# Patient Record
Sex: FEMALE | Race: WHITE | NOT HISPANIC OR LATINO | URBAN - NONMETROPOLITAN AREA
[De-identification: names, ages, dates, MRNs, and addresses within clinical notes are randomized per-mention and may not be internally consistent; named-entity substitution may affect disease eponyms.]

---

## 2022-09-02 ENCOUNTER — OFFICE VISIT (OUTPATIENT)
Dept: FAMILY MEDICINE | Facility: CLINIC | Age: 67
End: 2022-09-02
Payer: MEDICARE

## 2022-09-02 VITALS
BODY MASS INDEX: 27.45 KG/M2 | OXYGEN SATURATION: 96 % | WEIGHT: 160.81 LBS | TEMPERATURE: 98 F | HEART RATE: 93 BPM | HEIGHT: 64 IN | RESPIRATION RATE: 18 BRPM | DIASTOLIC BLOOD PRESSURE: 85 MMHG | SYSTOLIC BLOOD PRESSURE: 139 MMHG

## 2022-09-02 DIAGNOSIS — H61.22 IMPACTED CERUMEN OF LEFT EAR: Primary | ICD-10-CM

## 2022-09-02 DIAGNOSIS — H92.02 OTALGIA, LEFT: ICD-10-CM

## 2022-09-02 PROCEDURE — 99213 OFFICE O/P EST LOW 20 MIN: CPT | Mod: ,,, | Performed by: FAMILY MEDICINE

## 2022-09-02 PROCEDURE — 99213 PR OFFICE/OUTPT VISIT, EST, LEVL III, 20-29 MIN: ICD-10-PCS | Mod: ,,, | Performed by: FAMILY MEDICINE

## 2022-09-02 NOTE — PROGRESS NOTES
Logan Jones MD    [address] [place] [address]     PATIENT NAME: Kriss Gill  : 1955  DATE: 22  MRN: [id number]      Billing Provider: Logan Jones MD  Level of Service: IN OFFICE/OUTPT VISIT, EST, LEVL III, 20-29 MIN  Patient PCP Information       Provider PCP Type    Logan Jones MD General            Reason for Visit / Chief Complaint: Otalgia (Reports pain with feelings of fullness in and around her left ear since Wednesday and she put ear drops in there yesterday and now it feels completely clogged.  )       Update PCP  Update Chief Complaint         History of Present Illness / Problem Focused Workflow     Kriss Gill presents to the clinic with Otalgia (Reports pain with feelings of fullness in and around her left ear since Wednesday and she put ear drops in there yesterday and now it feels completely clogged.  )     Otalgia   Pertinent negatives include no abdominal pain, coughing, diarrhea, headaches, hearing loss, rash, sore throat or vomiting.     Review of Systems     Review of Systems   Constitutional:  Negative for activity change, appetite change, chills, fatigue and fever.   HENT:  Positive for ear pain. Negative for nasal congestion, hearing loss, postnasal drip and sore throat.    Respiratory:  Negative for cough, chest tightness, shortness of breath and wheezing.    Cardiovascular:  Negative for chest pain, palpitations, leg swelling and claudication.   Gastrointestinal:  Negative for abdominal pain, change in bowel habit, constipation, diarrhea, nausea, vomiting and change in bowel habit.   Genitourinary:  Negative for dysuria.   Musculoskeletal:  Negative for arthralgias, back pain, gait problem and myalgias.   Integumentary:  Negative for rash.   Neurological:  Negative for weakness and headaches.   Psychiatric/Behavioral:  Negative for suicidal ideas. The patient is not nervous/anxious.        Medical / Social / Family History   History reviewed. No pertinent past medical history.    Past Surgical History:   Procedure Laterality Date    HYSTERECTOMY         Social History  Ms.  reports that she has never smoked. She has never used smokeless tobacco. She reports that she does not drink alcohol and does not use drugs.    Family History  Ms.'s family history includes Heart disease in her mother; Meningitis in her father; Tuberculosis in her father.    Medications and Allergies     Medications  No outpatient medications have been marked as taking for the 9/2/22 encounter (Office Visit) with Logan Jones MD.       Allergies  Review of patient's allergies indicates:   Allergen Reactions    Pcn [penicillins]        Physical Examination     Vitals:    09/02/22 1559   BP: 139/85   Pulse: 93   Resp: 18   Temp: 98.4 °F (36.9 °C)     Physical Exam  Vitals and nursing note reviewed.   Constitutional:       General: She is not in acute distress.     Appearance: Normal appearance. She is not ill-appearing.   HENT:      Right Ear: Tympanic membrane, ear canal and external ear normal.      Left Ear: There is impacted cerumen.   Eyes:      Extraocular Movements: Extraocular movements intact.      Pupils: Pupils are equal, round, and reactive to light.   Cardiovascular:      Rate and Rhythm: Normal rate and regular rhythm.      Heart sounds: Normal heart sounds.   Pulmonary:      Effort: Pulmonary effort is normal.      Breath sounds: Normal breath sounds.   Abdominal:      General: Bowel sounds are normal.      Palpations: Abdomen is soft.   Musculoskeletal:         General: Normal range of motion.   Skin:     Findings: No rash.   Neurological:      General: No focal deficit present.      Mental Status: She is alert and oriented to person, place, and time. Mental status is at baseline.   Psychiatric:         Mood and Affect: Mood normal.         Behavior: Behavior normal.        Assessment and Plan (including  Health Maintenance)      Problem List  Smart Sets  Document Outside HM   :    Plan:         Health Maintenance Due   Topic Date Due    Hepatitis C Screening  Never done    Lipid Panel  Never done    COVID-19 Vaccine (1) Never done    TETANUS VACCINE  Never done    Mammogram  Never done    DEXA Scan  Never done    Colorectal Cancer Screening  Never done    Shingles Vaccine (1 of 2) Never done    Pneumococcal Vaccines (Age 65+) (1 - PCV) Never done    Influenza Vaccine (1) Never done       Problem List Items Addressed This Visit          ENT    Impacted cerumen of left ear - Primary    Otalgia, left     Impacted cerumen of left ear    Otalgia, left     The patient has no Health Maintenance topics of status Not Due    Procedures     No future appointments.     Follow up if symptoms worsen or fail to improve.     Signature:  Logan Jones MD  [address] [place] [address]  Phone #: [phone]  Fax #: [phone]    Date of encounter: 9/2/22    There are no Patient Instructions on file for this visit.

## 2024-03-11 LAB
LEFT EYE DM RETINOPATHY: NEGATIVE
RIGHT EYE DM RETINOPATHY: NEGATIVE

## 2024-03-22 ENCOUNTER — OFFICE VISIT (OUTPATIENT)
Dept: FAMILY MEDICINE | Facility: CLINIC | Age: 69
End: 2024-03-22
Payer: MEDICARE

## 2024-03-22 VITALS
DIASTOLIC BLOOD PRESSURE: 77 MMHG | BODY MASS INDEX: 25.98 KG/M2 | TEMPERATURE: 97 F | HEART RATE: 83 BPM | HEIGHT: 64 IN | RESPIRATION RATE: 18 BRPM | SYSTOLIC BLOOD PRESSURE: 122 MMHG | OXYGEN SATURATION: 95 % | WEIGHT: 152.19 LBS

## 2024-03-22 DIAGNOSIS — K13.79 MOUTH SORES: ICD-10-CM

## 2024-03-22 DIAGNOSIS — J02.9 PHARYNGITIS, UNSPECIFIED ETIOLOGY: Primary | ICD-10-CM

## 2024-03-22 PROCEDURE — 99213 OFFICE O/P EST LOW 20 MIN: CPT | Mod: ,,, | Performed by: NURSE PRACTITIONER

## 2024-03-22 PROCEDURE — 96372 THER/PROPH/DIAG INJ SC/IM: CPT | Mod: ,,, | Performed by: NURSE PRACTITIONER

## 2024-03-22 RX ORDER — AZITHROMYCIN 250 MG/1
TABLET, FILM COATED ORAL
Qty: 6 TABLET | Refills: 0 | Status: SHIPPED | OUTPATIENT
Start: 2024-03-22 | End: 2024-05-20

## 2024-03-22 RX ORDER — BETAMETHASONE SODIUM PHOSPHATE AND BETAMETHASONE ACETATE 3; 3 MG/ML; MG/ML
6 INJECTION, SUSPENSION INTRA-ARTICULAR; INTRALESIONAL; INTRAMUSCULAR; SOFT TISSUE ONCE
Status: COMPLETED | OUTPATIENT
Start: 2024-03-22 | End: 2024-03-22

## 2024-03-22 RX ORDER — NYSTATIN 100000 [USP'U]/ML
4 SUSPENSION ORAL 4 TIMES DAILY
Qty: 120 ML | Refills: 1 | Status: SHIPPED | OUTPATIENT
Start: 2024-03-22 | End: 2024-06-19

## 2024-03-22 RX ADMIN — BETAMETHASONE SODIUM PHOSPHATE AND BETAMETHASONE ACETATE 6 MG: 3; 3 INJECTION, SUSPENSION INTRA-ARTICULAR; INTRALESIONAL; INTRAMUSCULAR; SOFT TISSUE at 03:03

## 2024-03-22 NOTE — PROGRESS NOTES
Hortencia Galvan DNP, CORBIN    [address] [place] [address]     PATIENT NAME: Kriss Gill  : 1955  DATE: 3/22/24  MRN: [id number]      Billing Provider: Hortencia Galvan DNP, FNP  Level of Service:   Patient PCP Information       Provider PCP Type    Logan Jones MD General            Reason for Visit / Chief Complaint: Sore Throat (Pt had got sick with probable COVID 3/13/24 (her  had it and was tested but pt was not tested).  She still has soreness in the L side of her throat, worse in the mornings.  She says it feels swollen at times.  She says she has green colored nasal drainage and mucus with cough), Nasal Congestion, and Cough       Update PCP  Update Chief Complaint         History of Present Illness / Problem Focused Workflow     Kriss Gill presents to the clinic with Sore Throat (Pt had got sick with probable COVID 3/13/24 (her  had it and was tested but pt was not tested).  She still has soreness in the L side of her throat, worse in the mornings.  She says it feels swollen at times.  She says she has green colored nasal drainage and mucus with cough), Nasal Congestion, and Cough     Sore Throat   Associated symptoms include coughing. Pertinent negatives include no abdominal pain, congestion, diarrhea, ear pain, headaches, shortness of breath or vomiting.   Cough  Associated symptoms include a sore throat. Pertinent negatives include no chest pain, chills, ear pain, fever, headaches, myalgias, postnasal drip, rash, shortness of breath or wheezing.       Review of Systems     Review of Systems   Constitutional:  Negative for activity change, appetite change, chills, fatigue and fever.   HENT:  Positive for sore throat. Negative for nasal congestion, ear pain, hearing loss and postnasal drip.    Respiratory:  Positive for cough. Negative for chest tightness, shortness of breath and wheezing.    Cardiovascular:  Negative for  "chest pain, palpitations, leg swelling and claudication.   Gastrointestinal:  Negative for abdominal pain, change in bowel habit, constipation, diarrhea, nausea and vomiting.   Genitourinary:  Negative for dysuria.   Musculoskeletal:  Negative for arthralgias, back pain, gait problem and myalgias.   Integumentary:  Negative for rash.   Neurological:  Negative for weakness and headaches.   Psychiatric/Behavioral:  Negative for suicidal ideas. The patient is not nervous/anxious.         Medical / Social / Family History   History reviewed. No pertinent past medical history.    Past Surgical History:   Procedure Laterality Date    HYSTERECTOMY         Social History  Ms. Kriss Gill  reports that she has never smoked. She has never been exposed to tobacco smoke. She has never used smokeless tobacco. She reports that she does not drink alcohol and does not use drugs.    Family History  Ms. Kriss Gill's family history includes Heart disease in her mother; Meningitis in her father; Tuberculosis in her father.    Medications and Allergies     Medications  No outpatient medications have been marked as taking for the 3/22/24 encounter (Office Visit) with Hortencia Galvan DNP, FNP.     Current Facility-Administered Medications for the 3/22/24 encounter (Office Visit) with Hortencia Galvan DNP, FNP   Medication Dose Route Frequency Provider Last Rate Last Admin    [COMPLETED] betamethasone acetate-betamethasone sodium phosphate injection 6 mg  6 mg Intramuscular Once Hortencia Galvan DNP, FNP   6 mg at 03/22/24 1500       Allergies  Review of patient's allergies indicates:   Allergen Reactions    Pcn [penicillins]        Physical Examination     Vitals:    03/22/24 1431   BP: 122/77   BP Location: Left arm   Patient Position: Sitting   BP Method: Large (Automatic)   Pulse: 83   Resp: 18   Temp: 97.3 °F (36.3 °C)   TempSrc: Oral   SpO2: 95%   Weight: 69 kg (152 lb 3.2 oz)   Height: 5' 4" (1.626 m)     Physical " Exam  Vitals and nursing note reviewed.   Constitutional:       General: She is not in acute distress.  HENT:      Nose: Congestion and rhinorrhea present.      Mouth/Throat:      Mouth: Mucous membranes are moist.      Pharynx: Oropharyngeal exudate and posterior oropharyngeal erythema present.   Eyes:      Pupils: Pupils are equal, round, and reactive to light.   Cardiovascular:      Rate and Rhythm: Normal rate and regular rhythm.      Pulses: Normal pulses.      Heart sounds: Normal heart sounds. No murmur heard.  Pulmonary:      Effort: Pulmonary effort is normal. No respiratory distress.      Breath sounds: Normal breath sounds. No wheezing, rhonchi or rales.   Chest:      Chest wall: No tenderness.   Abdominal:      General: Bowel sounds are normal.      Palpations: Abdomen is soft.   Musculoskeletal:         General: Normal range of motion.      Cervical back: Normal range of motion and neck supple.      Right lower leg: No edema.      Left lower leg: No edema.   Skin:     General: Skin is warm and dry.   Neurological:      General: No focal deficit present.      Mental Status: She is alert and oriented to person, place, and time.          Assessment and Plan (including Health Maintenance)      Problem List  Smart Sets  Document Outside HM   :    Plan:         Health Maintenance Due   Topic Date Due    Hepatitis C Screening  Never done    Lipid Panel  Never done    COVID-19 Vaccine (1) Never done    TETANUS VACCINE  Never done    Mammogram  Never done    Hemoglobin A1c (Diabetic Prevention Screening)  Never done    DEXA Scan  Never done    Colorectal Cancer Screening  Never done    Shingles Vaccine (1 of 2) Never done    RSV Vaccine (Age 60+ and Pregnant patients) (1 - 1-dose 60+ series) Never done    Pneumococcal Vaccines (Age 65+) (1 of 1 - PCV) Never done       Problem List Items Addressed This Visit    None  Visit Diagnoses       Pharyngitis, unspecified etiology    -  Primary    Relevant Medications     betamethasone acetate-betamethasone sodium phosphate injection 6 mg (Completed)    azithromycin (ZITHROMAX Z-RADHA) 250 MG tablet    Mouth sores        Relevant Medications    nystatin (MYCOSTATIN) 100,000 unit/mL suspension          Pharyngitis, unspecified etiology  -     betamethasone acetate-betamethasone sodium phosphate injection 6 mg  -     azithromycin (ZITHROMAX Z-RADHA) 250 MG tablet; Take 2 tablets on Day 1 and 1 tablet daily for next 4 days.  Dispense: 6 tablet; Refill: 0    Mouth sores  -     nystatin (MYCOSTATIN) 100,000 unit/mL suspension; Take 4 mLs (400,000 Units total) by mouth 4 (four) times daily.  Dispense: 120 mL; Refill: 1       The patient has no Health Maintenance topics of status Not Due        No future appointments.     Follow up if symptoms worsen or fail to improve.     Signature:  Hortencia Galvan DNP, FNP  [address] [place] [address]  Phone #: [phone]  Fax #: [phone]    Date of encounter: 3/22/24    Patient Instructions   Increase fluid intake. Take meds as prescribed. Follow up if no improvement in 5-7 days.

## 2024-05-11 ENCOUNTER — OFFICE VISIT (OUTPATIENT)
Dept: FAMILY MEDICINE | Facility: CLINIC | Age: 69
End: 2024-05-11
Payer: MEDICARE

## 2024-05-11 VITALS
TEMPERATURE: 99 F | OXYGEN SATURATION: 96 % | RESPIRATION RATE: 16 BRPM | HEIGHT: 64 IN | BODY MASS INDEX: 25.27 KG/M2 | SYSTOLIC BLOOD PRESSURE: 126 MMHG | DIASTOLIC BLOOD PRESSURE: 81 MMHG | WEIGHT: 148 LBS | HEART RATE: 72 BPM

## 2024-05-11 DIAGNOSIS — R05.8 COUGH WITH EXPOSURE TO COVID-19 VIRUS: ICD-10-CM

## 2024-05-11 DIAGNOSIS — G44.83 HEADACHE AFTER COUGH: ICD-10-CM

## 2024-05-11 DIAGNOSIS — Z20.822 COUGH WITH EXPOSURE TO COVID-19 VIRUS: ICD-10-CM

## 2024-05-11 DIAGNOSIS — J06.9 UPPER RESPIRATORY TRACT INFECTION, UNSPECIFIED TYPE: Primary | ICD-10-CM

## 2024-05-11 LAB
CTP QC/QA: YES
CTP QC/QA: YES
POC MOLECULAR INFLUENZA A AGN: NEGATIVE
POC MOLECULAR INFLUENZA B AGN: NEGATIVE
SARS-COV-2 RDRP RESP QL NAA+PROBE: NEGATIVE

## 2024-05-11 PROCEDURE — 87502 INFLUENZA DNA AMP PROBE: CPT | Mod: RHCUB | Performed by: NURSE PRACTITIONER

## 2024-05-11 PROCEDURE — 99213 OFFICE O/P EST LOW 20 MIN: CPT | Mod: ,,, | Performed by: NURSE PRACTITIONER

## 2024-05-11 PROCEDURE — 96372 THER/PROPH/DIAG INJ SC/IM: CPT | Mod: ,,, | Performed by: NURSE PRACTITIONER

## 2024-05-11 PROCEDURE — 87635 SARS-COV-2 COVID-19 AMP PRB: CPT | Mod: RHCUB | Performed by: NURSE PRACTITIONER

## 2024-05-11 RX ORDER — CEFUROXIME AXETIL 250 MG/1
250 TABLET ORAL 2 TIMES DAILY
Qty: 14 TABLET | Refills: 0 | Status: SHIPPED | OUTPATIENT
Start: 2024-05-11 | End: 2024-05-20

## 2024-05-11 RX ORDER — ALBUTEROL SULFATE 90 UG/1
2 AEROSOL, METERED RESPIRATORY (INHALATION) EVERY 6 HOURS PRN
Qty: 25.5 G | Refills: 0 | Status: SHIPPED | OUTPATIENT
Start: 2024-05-11

## 2024-05-11 RX ORDER — BETAMETHASONE SODIUM PHOSPHATE AND BETAMETHASONE ACETATE 3; 3 MG/ML; MG/ML
6 INJECTION, SUSPENSION INTRA-ARTICULAR; INTRALESIONAL; INTRAMUSCULAR; SOFT TISSUE ONCE
Status: COMPLETED | OUTPATIENT
Start: 2024-05-11 | End: 2024-05-11

## 2024-05-11 RX ADMIN — BETAMETHASONE SODIUM PHOSPHATE AND BETAMETHASONE ACETATE 6 MG: 3; 3 INJECTION, SUSPENSION INTRA-ARTICULAR; INTRALESIONAL; INTRAMUSCULAR; SOFT TISSUE at 11:05

## 2024-05-11 NOTE — PATIENT INSTRUCTIONS
Increase fluid intake. Voice rest. Take meds as prescribed. Follow up if no improvement in 5-7 days.

## 2024-05-11 NOTE — PROGRESS NOTES
oHrtencia Galvan DNP, CORBIN    [address] [place] [address]     PATIENT NAME: Kriss Gill  : 1955  DATE: 24  MRN: [id number]      Billing Provider: Hortencia Galvan DNP, FNP  Level of Service:   Patient PCP Information       Provider PCP Type    Logan Jones MD General            Reason for Visit / Chief Complaint: Cough and Chest Congestion (Since Monday )       Update PCP  Update Chief Complaint         History of Present Illness / Problem Focused Workflow     Kriss Gill presents to the clinic with Cough and Chest Congestion (Since Monday )         Review of Systems     Review of Systems   Constitutional:  Negative for appetite change, fatigue, fever and unexpected weight change.   HENT:  Positive for postnasal drip, rhinorrhea and voice change. Negative for hearing loss.    Eyes:  Negative for visual disturbance.   Respiratory:  Positive for cough. Negative for shortness of breath.    Cardiovascular:  Negative for chest pain.   Gastrointestinal:  Negative for abdominal pain, constipation, diarrhea, nausea and vomiting.   Genitourinary:  Negative for dysuria.   Musculoskeletal:  Negative for back pain.   Neurological:  Positive for headaches.   Psychiatric/Behavioral:  Negative for sleep disturbance.         Medical / Social / Family History   History reviewed. No pertinent past medical history.    Past Surgical History:   Procedure Laterality Date    HYSTERECTOMY         Social History  Ms. Gill reports that she has never smoked. She has never been exposed to tobacco smoke. She has never used smokeless tobacco. She reports that she does not drink alcohol and does not use drugs.    Family History  's family history includes Heart disease in her mother; Meningitis in her father; Tuberculosis in her father.    Medications and Allergies     Medications  No outpatient medications have been marked as taking for the 24  "encounter (Office Visit) with Hortencia Galvan DNP, FNP.     Current Facility-Administered Medications for the 5/11/24 encounter (Office Visit) with Hortencia Galvan DNP, FNP   Medication Dose Route Frequency Provider Last Rate Last Admin    betamethasone acetate-betamethasone sodium phosphate injection 6 mg  6 mg Intramuscular Once Hortencia Galvan DNP, FNP           Allergies  Review of patient's allergies indicates:   Allergen Reactions    Pcn [penicillins]        Physical Examination     Vitals:    05/11/24 1016 05/11/24 1024   BP: (!) 156/81 126/81   BP Location: Left arm Right arm   Patient Position: Sitting Sitting   BP Method: X-Large (Automatic) Large (Automatic)   Pulse: 72    Resp: 16    Temp: 98.5 °F (36.9 °C)    TempSrc: Oral    SpO2: 96%    Weight: 67.1 kg (148 lb)    Height: 5' 4" (1.626 m)       Physical Exam  Vitals and nursing note reviewed.   Constitutional:       General: She is not in acute distress.  HENT:      Nose: Congestion and rhinorrhea present.      Mouth/Throat:      Mouth: Mucous membranes are moist.   Eyes:      Pupils: Pupils are equal, round, and reactive to light.   Cardiovascular:      Rate and Rhythm: Normal rate and regular rhythm.      Pulses: Normal pulses.      Heart sounds: Normal heart sounds. No murmur heard.  Pulmonary:      Effort: Pulmonary effort is normal. No respiratory distress.      Breath sounds: Normal breath sounds. No wheezing, rhonchi or rales.   Chest:      Chest wall: No tenderness.   Abdominal:      General: Bowel sounds are normal.      Palpations: Abdomen is soft.   Musculoskeletal:         General: Normal range of motion.      Cervical back: Normal range of motion and neck supple.      Right lower leg: No edema.      Left lower leg: No edema.   Skin:     General: Skin is warm and dry.   Neurological:      General: No focal deficit present.      Mental Status: She is alert and oriented to person, place, and time.            Assessment and Plan (including " Health Maintenance)      Problem List  Smart Sets  Document Outside HM   :    Plan:         Health Maintenance Due   Topic Date Due    Hepatitis C Screening  Never done    Lipid Panel  Never done    TETANUS VACCINE  Never done    Mammogram  Never done    Hemoglobin A1c (Diabetic Prevention Screening)  Never done    DEXA Scan  Never done    Colorectal Cancer Screening  Never done    Shingles Vaccine (1 of 2) Never done    RSV Vaccine (Age 60+ and Pregnant patients) (1 - 1-dose 60+ series) Never done    Pneumococcal Vaccines (Age 65+) (1 of 1 - PCV) Never done    COVID-19 Vaccine (1 - 2023-24 season) Never done       Problem List Items Addressed This Visit    None  Visit Diagnoses       Upper respiratory tract infection, unspecified type    -  Primary    Relevant Medications    betamethasone acetate-betamethasone sodium phosphate injection 6 mg (Start on 5/11/2024 12:15 PM)    cefUROXime (CEFTIN) 250 MG tablet    albuterol (VENTOLIN HFA) 90 mcg/actuation inhaler    Cough with exposure to COVID-19 virus        Relevant Medications    albuterol (VENTOLIN HFA) 90 mcg/actuation inhaler    Other Relevant Orders    POCT COVID-19 Rapid Screening (Completed)    Headache after cough        Relevant Orders    POCT Influenza A/B Molecular (Completed)            The patient has no Health Maintenance topics of status Not Due        No future appointments.     Follow up if symptoms worsen or fail to improve.     Signature:  Hortencia Galvan DNP, FNP  [address] [place] [address]  Phone #: [phone]  Fax #: [phone]    Date of encounter: 5/11/24    Patient Instructions   Increase fluid intake. Voice rest. Take meds as prescribed. Follow up if no improvement in 5-7 days.

## 2024-05-20 ENCOUNTER — OFFICE VISIT (OUTPATIENT)
Dept: FAMILY MEDICINE | Facility: CLINIC | Age: 69
End: 2024-05-20
Payer: MEDICARE

## 2024-05-20 VITALS
TEMPERATURE: 98 F | BODY MASS INDEX: 25.44 KG/M2 | DIASTOLIC BLOOD PRESSURE: 72 MMHG | HEART RATE: 74 BPM | WEIGHT: 149 LBS | OXYGEN SATURATION: 97 % | SYSTOLIC BLOOD PRESSURE: 122 MMHG | RESPIRATION RATE: 18 BRPM | HEIGHT: 64 IN

## 2024-05-20 DIAGNOSIS — Z13.220 SCREENING FOR LIPID DISORDERS: ICD-10-CM

## 2024-05-20 DIAGNOSIS — Z13.1 SCREENING FOR DIABETES MELLITUS: ICD-10-CM

## 2024-05-20 DIAGNOSIS — Z13.0 ENCOUNTER FOR SCREENING FOR DISEASES OF THE BLOOD AND BLOOD-FORMING ORGANS AND CERTAIN DISORDERS INVOLVING THE IMMUNE MECHANISM: ICD-10-CM

## 2024-05-20 DIAGNOSIS — R73.09 OTHER ABNORMAL GLUCOSE: ICD-10-CM

## 2024-05-20 DIAGNOSIS — R79.89 OTHER SPECIFIED ABNORMAL FINDINGS OF BLOOD CHEMISTRY: ICD-10-CM

## 2024-05-20 DIAGNOSIS — R73.9 HYPERGLYCEMIA: Primary | ICD-10-CM

## 2024-05-20 DIAGNOSIS — R20.2 PARESTHESIA OF BOTH LOWER EXTREMITIES: ICD-10-CM

## 2024-05-20 LAB
ALBUMIN SERPL BCP-MCNC: 3.6 G/DL (ref 3.5–5)
ALBUMIN/GLOB SERPL: 1 {RATIO}
ALP SERPL-CCNC: 143 U/L (ref 55–142)
ALT SERPL W P-5'-P-CCNC: 20 U/L (ref 13–56)
ANION GAP SERPL CALCULATED.3IONS-SCNC: 9 MMOL/L (ref 7–16)
AST SERPL W P-5'-P-CCNC: 17 U/L (ref 15–37)
BASOPHILS # BLD AUTO: 0.05 K/UL (ref 0–0.2)
BASOPHILS NFR BLD AUTO: 0.8 % (ref 0–1)
BILIRUB SERPL-MCNC: 0.6 MG/DL (ref ?–1.2)
BILIRUB SERPL-MCNC: NORMAL MG/DL
BLOOD URINE, POC: NORMAL
BUN SERPL-MCNC: 17 MG/DL (ref 7–18)
BUN/CREAT SERPL: 20 (ref 6–20)
CALCIUM SERPL-MCNC: 8.4 MG/DL (ref 8.5–10.1)
CHLORIDE SERPL-SCNC: 102 MMOL/L (ref 98–107)
CHOLEST SERPL-MCNC: 263 MG/DL (ref 0–200)
CHOLEST/HDLC SERPL: 5.1 {RATIO}
CO2 SERPL-SCNC: 28 MMOL/L (ref 21–32)
COLOR, POC UA: YELLOW
CREAT SERPL-MCNC: 0.85 MG/DL (ref 0.55–1.02)
CREAT UR-MCNC: 71 MG/DL (ref 28–219)
DIFFERENTIAL METHOD BLD: ABNORMAL
EGFR (NO RACE VARIABLE) (RUSH/TITUS): 74 ML/MIN/1.73M2
EOSINOPHIL # BLD AUTO: 0.18 K/UL (ref 0–0.5)
EOSINOPHIL NFR BLD AUTO: 2.8 % (ref 1–4)
ERYTHROCYTE [DISTWIDTH] IN BLOOD BY AUTOMATED COUNT: 13.3 % (ref 11.5–14.5)
EST. AVERAGE GLUCOSE BLD GHB EST-MCNC: 341 MG/DL
GLOBULIN SER-MCNC: 3.7 G/DL (ref 2–4)
GLUCOSE SERPL-MCNC: 295 MG/DL (ref 74–106)
GLUCOSE UR QL STRIP: 1000
HBA1C MFR BLD HPLC: 13.5 % (ref 4.5–6.6)
HCT VFR BLD AUTO: 43.3 % (ref 38–47)
HDLC SERPL-MCNC: 52 MG/DL (ref 40–60)
HGB BLD-MCNC: 13.3 G/DL (ref 12–16)
IMM GRANULOCYTES # BLD AUTO: 0.05 K/UL (ref 0–0.04)
IMM GRANULOCYTES NFR BLD: 0.8 % (ref 0–0.4)
KETONES UR QL STRIP: NORMAL
LDLC SERPL CALC-MCNC: 172 MG/DL
LDLC/HDLC SERPL: 3.3 {RATIO}
LEUKOCYTE ESTERASE URINE, POC: NORMAL
LYMPHOCYTES # BLD AUTO: 1.81 K/UL (ref 1–4.8)
LYMPHOCYTES NFR BLD AUTO: 28.5 % (ref 27–41)
MCH RBC QN AUTO: 28 PG (ref 27–31)
MCHC RBC AUTO-ENTMCNC: 30.7 G/DL (ref 32–36)
MCV RBC AUTO: 91.2 FL (ref 80–96)
MICROALBUMIN UR-MCNC: 1.3 MG/DL (ref 0–2.8)
MICROALBUMIN/CREAT RATIO PNL UR: 18.3 MG/G (ref 0–30)
MONOCYTES # BLD AUTO: 0.45 K/UL (ref 0–0.8)
MONOCYTES NFR BLD AUTO: 7.1 % (ref 2–6)
MPC BLD CALC-MCNC: 9.6 FL (ref 9.4–12.4)
NEUTROPHILS # BLD AUTO: 3.82 K/UL (ref 1.8–7.7)
NEUTROPHILS NFR BLD AUTO: 60 % (ref 53–65)
NITRITE, POC UA: NORMAL
NONHDLC SERPL-MCNC: 211 MG/DL
NRBC # BLD AUTO: 0 X10E3/UL
NRBC, AUTO (.00): 0 %
PH, POC UA: 505
PLATELET # BLD AUTO: 395 K/UL (ref 150–400)
POTASSIUM SERPL-SCNC: 4.1 MMOL/L (ref 3.5–5.1)
PROT SERPL-MCNC: 7.3 G/DL (ref 6.4–8.2)
PROTEIN, POC: NORMAL
RBC # BLD AUTO: 4.75 M/UL (ref 4.2–5.4)
SODIUM SERPL-SCNC: 135 MMOL/L (ref 136–145)
SPECIFIC GRAVITY, POC UA: 1.01
TRIGL SERPL-MCNC: 194 MG/DL (ref 35–150)
UROBILINOGEN, POC UA: 0.2
VLDLC SERPL-MCNC: 39 MG/DL
WBC # BLD AUTO: 6.36 K/UL (ref 4.5–11)

## 2024-05-20 PROCEDURE — 82043 UR ALBUMIN QUANTITATIVE: CPT | Mod: ,,, | Performed by: CLINICAL MEDICAL LABORATORY

## 2024-05-20 PROCEDURE — 81003 URINALYSIS AUTO W/O SCOPE: CPT | Mod: RHCUB | Performed by: NURSE PRACTITIONER

## 2024-05-20 PROCEDURE — 80061 LIPID PANEL: CPT | Mod: ,,, | Performed by: CLINICAL MEDICAL LABORATORY

## 2024-05-20 PROCEDURE — 82570 ASSAY OF URINE CREATININE: CPT | Mod: ,,, | Performed by: CLINICAL MEDICAL LABORATORY

## 2024-05-20 PROCEDURE — 80053 COMPREHEN METABOLIC PANEL: CPT | Mod: ,,, | Performed by: CLINICAL MEDICAL LABORATORY

## 2024-05-20 PROCEDURE — 83036 HEMOGLOBIN GLYCOSYLATED A1C: CPT | Mod: ,,, | Performed by: CLINICAL MEDICAL LABORATORY

## 2024-05-20 PROCEDURE — 99214 OFFICE O/P EST MOD 30 MIN: CPT | Mod: ,,, | Performed by: NURSE PRACTITIONER

## 2024-05-20 PROCEDURE — 85025 COMPLETE CBC W/AUTO DIFF WBC: CPT | Mod: ,,, | Performed by: CLINICAL MEDICAL LABORATORY

## 2024-05-20 NOTE — PROGRESS NOTES
Hortencia Galvan DNP, CORBIN    [address] [place] [address]     PATIENT NAME: Kriss Gill  : 1955  DATE: 24  MRN: [id number]      Billing Provider: Hortencia Galvan DNP, FNP  Level of Service:   Patient PCP Information       Provider PCP Type    Logan Jones MD General            Reason for Visit / Chief Complaint: Diabetes (Pt has been keeping a log of FS glucose.  It's been as high as 414 but was 296 this morning after trying to pay attention to diet and drinking more water.  Has never been diagnosed with diabetes.  ), Tingling (Pt has tingling in B feet and in her hands occasionally as well), and Health Maintenance (Discussed overdue health maintenance - pt declines to schedule.  )       Update PCP  Update Chief Complaint         History of Present Illness / Problem Focused Workflow     Kriss Gill presents to the clinic with Diabetes (Pt has been keeping a log of FS glucose.  It's been as high as 414 but was 296 this morning after trying to pay attention to diet and drinking more water.  Has never been diagnosed with diabetes.  ), Tingling (Pt has tingling in B feet and in her hands occasionally as well), and Health Maintenance (Discussed overdue health maintenance - pt declines to schedule.  )     Pt received steroid injection approx 10 days for upper respiratory infection.   Pt's has + family history for diabetes--mother.    Diabetes  Pertinent negatives for hypoglycemia include no headaches or nervousness/anxiousness. Pertinent negatives for diabetes include no chest pain, no fatigue and no weakness.       Review of Systems     Review of Systems   Constitutional:  Negative for activity change, appetite change, chills, fatigue and fever.   HENT:  Negative for nasal congestion, ear pain, hearing loss, postnasal drip and sore throat.    Respiratory:  Negative for cough, chest tightness, shortness of breath and wheezing.    Cardiovascular:   "Negative for chest pain, palpitations, leg swelling and claudication.   Gastrointestinal:  Negative for abdominal pain, change in bowel habit, constipation, diarrhea, nausea and vomiting.   Genitourinary:  Negative for dysuria.   Musculoskeletal:  Negative for arthralgias, back pain, gait problem and myalgias.   Integumentary:  Negative for rash.   Neurological:  Positive for numbness (parathesias bilateral feet). Negative for weakness and headaches.   Psychiatric/Behavioral:  Negative for suicidal ideas. The patient is not nervous/anxious.         Medical / Social / Family History   History reviewed. No pertinent past medical history.    Past Surgical History:   Procedure Laterality Date    HYSTERECTOMY         Social History  Ms. Kriss Gill  reports that she has never smoked. She has never been exposed to tobacco smoke. She has never used smokeless tobacco. She reports that she does not drink alcohol and does not use drugs.    Family History  Ms. Kriss Gill's family history includes Heart disease in her mother; Meningitis in her father; Tuberculosis in her father.    Medications and Allergies     Medications  Outpatient Medications Marked as Taking for the 5/20/24 encounter (Office Visit) with Hortencia Galvan, ZULMA, FNP   Medication Sig Dispense Refill    albuterol (VENTOLIN HFA) 90 mcg/actuation inhaler Inhale 2 puffs into the lungs every 6 (six) hours as needed for Wheezing. Rescue 25.5 g 0       Allergies  Review of patient's allergies indicates:   Allergen Reactions    Pcn [penicillins]        Physical Examination     Vitals:    05/20/24 1010   BP: 122/72   BP Location: Left arm   Patient Position: Sitting   BP Method: Large (Automatic)   Pulse: 74   Resp: 18   Temp: 98.2 °F (36.8 °C)   TempSrc: Oral   SpO2: 97%   Weight: 67.6 kg (149 lb)   Height: 5' 4" (1.626 m)     Physical Exam  Vitals and nursing note reviewed.   Constitutional:       General: She is not in acute distress.  HENT:      Nose: " Nose normal.      Mouth/Throat:      Mouth: Mucous membranes are moist.   Eyes:      Pupils: Pupils are equal, round, and reactive to light.   Cardiovascular:      Rate and Rhythm: Normal rate and regular rhythm.      Pulses: Normal pulses.           Dorsalis pedis pulses are 2+ on the right side and 2+ on the left side.        Posterior tibial pulses are 2+ on the right side and 2+ on the left side.      Heart sounds: Normal heart sounds. No murmur heard.  Pulmonary:      Effort: Pulmonary effort is normal. No respiratory distress.      Breath sounds: Normal breath sounds. No wheezing, rhonchi or rales.   Chest:      Chest wall: No tenderness.   Abdominal:      General: Bowel sounds are normal.      Palpations: Abdomen is soft.   Musculoskeletal:         General: Normal range of motion.      Cervical back: Normal range of motion and neck supple.      Right lower leg: No edema.      Left lower leg: No edema.        Feet:    Feet:      Right foot:      Protective Sensation: 10 sites tested.  10 sites sensed.      Toenail Condition: Right toenails are normal.      Left foot:      Protective Sensation: 10 sites tested.  10 sites sensed.      Skin integrity: Skin integrity normal.      Toenail Condition: Left toenails are normal.      Comments: Healing wound--slow to heal--injury happened approx 3 weeks ago  Skin:     General: Skin is warm and dry.   Neurological:      General: No focal deficit present.      Mental Status: She is alert and oriented to person, place, and time.          Assessment and Plan (including Health Maintenance)      Problem List  Smart Sets  Document Outside HM   :    Plan:         Health Maintenance Due   Topic Date Due    Hepatitis C Screening  Never done    Lipid Panel  Never done    TETANUS VACCINE  Never done    Mammogram  Never done    Hemoglobin A1c (Diabetic Prevention Screening)  Never done    DEXA Scan  Never done    Colorectal Cancer Screening  Never done    Shingles Vaccine (1 of 2)  Never done    RSV Vaccine (Age 60+ and Pregnant patients) (1 - 1-dose 60+ series) Never done    Pneumococcal Vaccines (Age 65+) (1 of 1 - PCV) Never done    COVID-19 Vaccine (1 - 2023-24 season) Never done       Problem List Items Addressed This Visit    None  Visit Diagnoses       Hyperglycemia    -  Primary    Relevant Orders    Comprehensive Metabolic Panel    Microalbumin/Creatinine Ratio, Urine    Hemoglobin A1C    POCT URINALYSIS W/O SCOPE (Completed)    Foot Exam Performed (Completed)    Screening for lipid disorders        Relevant Orders    Lipid Panel    Screening for diabetes mellitus        Relevant Orders    Comprehensive Metabolic Panel    Encounter for screening for diseases of the blood and blood-forming organs and certain disorders involving the immune mechanism        Relevant Orders    CBC Auto Differential    Other abnormal glucose        Relevant Orders    CBC Auto Differential    Other specified abnormal findings of blood chemistry        Relevant Orders    Lipid Panel    Paresthesia of both lower extremities        Relevant Orders    Foot Exam Performed (Completed)    Body mass index 25.0-25.9, adult              Hyperglycemia  -     Comprehensive Metabolic Panel; Future; Expected date: 05/20/2024  -     Microalbumin/Creatinine Ratio, Urine  -     Hemoglobin A1C; Future; Expected date: 05/20/2024  -     POCT URINALYSIS W/O SCOPE  -     Foot Exam Performed    Screening for lipid disorders  -     Lipid Panel; Future; Expected date: 05/20/2024    Screening for diabetes mellitus  -     Comprehensive Metabolic Panel; Future; Expected date: 05/20/2024    Encounter for screening for diseases of the blood and blood-forming organs and certain disorders involving the immune mechanism  -     CBC Auto Differential; Future; Expected date: 05/20/2024    Other abnormal glucose  -     CBC Auto Differential; Future; Expected date: 05/20/2024    Other specified abnormal findings of blood chemistry  -     Lipid  Panel; Future; Expected date: 05/20/2024    Paresthesia of both lower extremities  -     Foot Exam Performed    Body mass index 25.0-25.9, adult       The patient has no Health Maintenance topics of status Not Due      No future appointments.       Follow up in about 4 weeks (around 6/17/2024).     Signature:  Hortencia Galvan DNP, FNP  [address] [place] [address]  Phone #: [phone]  Fax #: [phone]    Date of encounter: 5/20/24    Patient Instructions   Await labs.

## 2024-05-21 ENCOUNTER — TELEPHONE (OUTPATIENT)
Dept: FAMILY MEDICINE | Facility: CLINIC | Age: 69
End: 2024-05-21
Payer: MEDICARE

## 2024-05-21 DIAGNOSIS — E11.65 TYPE 2 DIABETES MELLITUS WITH HYPERGLYCEMIA, WITHOUT LONG-TERM CURRENT USE OF INSULIN: Primary | ICD-10-CM

## 2024-05-21 RX ORDER — METFORMIN HYDROCHLORIDE 500 MG/1
1000 TABLET ORAL 2 TIMES DAILY WITH MEALS
Qty: 120 TABLET | Refills: 0 | Status: SHIPPED | OUTPATIENT
Start: 2024-05-21

## 2024-05-21 RX ORDER — INSULIN PUMP SYRINGE, 3 ML
EACH MISCELLANEOUS
Qty: 1 EACH | Refills: 0 | Status: SHIPPED | OUTPATIENT
Start: 2024-05-21 | End: 2024-06-17 | Stop reason: SDUPTHER

## 2024-05-21 RX ORDER — LANCETS
1 EACH MISCELLANEOUS DAILY
Qty: 100 EACH | Refills: 0 | Status: SHIPPED | OUTPATIENT
Start: 2024-05-21 | End: 2024-06-17 | Stop reason: SDUPTHER

## 2024-05-21 NOTE — TELEPHONE ENCOUNTER
Called Pt to offer time to talk about current labs. Newly Dx DM with 13.5%. Pt states seeing much higher glucose on spouse's monitor, ranges in the 300mg-400mg seen. Glucose on clinic visit was 295mg, which Pt states that was a lower value than what has been seen at home. Cholesterol 263mg, TG 194mg.  Hortencia Galvan NP has sent Metformin 500mg, 2 BID to pharmacy. Pt will need a glucose monitor of own to test, Rx to be sent.  Pt makes plans to come in on Thurs at 9.

## 2024-05-21 NOTE — TELEPHONE ENCOUNTER
Called Pt to give lab results, Hgba1c 13.5%, Lipids; Cholesterol 263mg, Glucose 295mg day of visit. I explained newly Dx DM and offered a time to talk about meal planning and management of DM.  I explained [facility]ID [facility] and how to titrate taking first week only 500mg with supper, progress to 500mg with br'fast and supper.  Will discuss on our appt this Thurs at 9 how to continue metformin, how to make meal planning a goal. Pt reports drinking water thru the day due to thirst and thought the increase of water consumed was causing the frequent urination.  Pt has been using spouse's monitor and has seen some 400mg, said the 295mg on day of clinic visit was the lowest seen in awhile.

## 2024-05-21 NOTE — TELEPHONE ENCOUNTER
After discussion with pt, Mrs. Mcnamara requests an order for pt to have her own glucose monitoring system, rather than relying on her 's.

## 2024-05-22 ENCOUNTER — NUTRITION (OUTPATIENT)
Dept: FAMILY MEDICINE | Facility: CLINIC | Age: 69
End: 2024-05-22
Payer: MEDICARE

## 2024-05-22 DIAGNOSIS — E11.65 TYPE 2 DIABETES MELLITUS WITH HYPERGLYCEMIA, WITHOUT LONG-TERM CURRENT USE OF INSULIN: Primary | ICD-10-CM

## 2024-05-22 NOTE — PROGRESS NOTES
Pt and spouse come in to discuss newly Dx DM. Current Hgba1c 13.5%, averaging BS over 3 months ~341mg. I explained how the body uses food for energy, what the Hgba1c  lab was and goal to strive for tighter BS with drinking water thru the day, counting CARB choices, and walking regime daily starting short walks and making walks daily a habit.  Pt given explanation of how metformin works and how to titrate weekly doses. Pt's spouse had metformin 850mg tablets, many bottles as explained to me as receiving from mail order. Pt wants to cut in 1/2 which could be taken as listed on metformin sheet. Pt working on nightly dose for week.  A monitor is at [facility] waiting for . So Pt will test BS first thing q morning with own monitor, has been using spouse's.   A meal guide explained on portion control of CARB choices, we read many food labels with serving size and Total CHO, using 15gTotal CHO as 1 CARB choice. Planned sample meals with 2-4CARB choices and plenty of the non-starchy veggies for satiety. Pt to call me if questions arise. Pt reports not wanting to be on meds for DM but I explained metformin will assist with tighter BS control and one could possibly not have to take meds, but BS's have been much elevated. Pt reports mom had DM and  due to DM. I expressed one can take care of DM and gave encouragement to be on top of goals to use behavior modification to make best habits for health.  Will followup with Pt next few weeks.

## 2024-05-28 ENCOUNTER — TELEPHONE (OUTPATIENT)
Dept: FAMILY MEDICINE | Facility: CLINIC | Age: 69
End: 2024-05-28
Payer: MEDICARE

## 2024-05-28 NOTE — TELEPHONE ENCOUNTER
"Patient called stating that there is an issue with her test strips and glucose meter kit at [facility]. I spoke with the  at [facility], and she states that the test strips were not a preferred brand by her insurance. She connected me with the pharmacist, and I gave him a verbal order to switch to the preferred brand by her insurance. As for the meter, he said they needed a CMN form completed that had been faxed over once it was prescribed. I told him we had not received a CMN form, and asked if they could re-fax it over. He states "it automatically faxes once it it's prescribed, so we can't send it again." I looked back through emails from last week, and nothing was received. I found a CMN form for [facility] online that we can fill out and fax to them and see if it will be accepted. I called patient and notified her of this and told her we would keep her updated.  "

## 2024-05-30 NOTE — TELEPHONE ENCOUNTER
"Spoke with the pharmacy tech at [facility] and asked if they could send me a CMN form to fill out for the patient. She states they do not physically have the form. The form comes from Medicare, if Medicare does not get a response, then they get in touch with [facility] and they fax it. She told me they have an "influx of patients needing CMN forms so it just may be taking a while." I told her I would keep an eye out.  "

## 2024-06-03 ENCOUNTER — TELEPHONE (OUTPATIENT)
Dept: FAMILY MEDICINE | Facility: CLINIC | Age: 69
End: 2024-06-03
Payer: MEDICARE

## 2024-06-03 DIAGNOSIS — E11.65 TYPE 2 DIABETES MELLITUS WITH HYPERGLYCEMIA, WITHOUT LONG-TERM CURRENT USE OF INSULIN: Primary | ICD-10-CM

## 2024-06-03 NOTE — TELEPHONE ENCOUNTER
Patient left a voicemail asking for a nurse to call her to discuss being referred to endocrinology. Spoke with Hortencia, who said we can refer her to endocrinology, but to let her know that if will probably be around 6 months due to her only having one elevated A1C reading. Also wanted to tell the patient that we completed the CMN form that was faxed to us this morning, and it was received by Darius. Tried to contact patient, but her phone number went straight to voicemail. Left a voicemail asking the patient to call back.

## 2024-06-03 NOTE — TELEPHONE ENCOUNTER
Patient called back and stated that she wanted to be referred to Keisha Ramirez with [facility] in [place], phone number [phone]. Referral placed.

## 2024-06-09 DIAGNOSIS — Z71.89 COMPLEX CARE COORDINATION: ICD-10-CM

## 2024-06-17 ENCOUNTER — TELEPHONE (OUTPATIENT)
Dept: FAMILY MEDICINE | Facility: CLINIC | Age: 69
End: 2024-06-17
Payer: MEDICARE

## 2024-06-17 ENCOUNTER — OFFICE VISIT (OUTPATIENT)
Dept: FAMILY MEDICINE | Facility: CLINIC | Age: 69
End: 2024-06-17
Payer: MEDICARE

## 2024-06-17 VITALS
TEMPERATURE: 98 F | BODY MASS INDEX: 25.1 KG/M2 | HEART RATE: 69 BPM | RESPIRATION RATE: 18 BRPM | SYSTOLIC BLOOD PRESSURE: 123 MMHG | HEIGHT: 64 IN | OXYGEN SATURATION: 95 % | WEIGHT: 147 LBS | DIASTOLIC BLOOD PRESSURE: 73 MMHG

## 2024-06-17 DIAGNOSIS — E11.65 TYPE 2 DIABETES MELLITUS WITH HYPERGLYCEMIA, WITHOUT LONG-TERM CURRENT USE OF INSULIN: ICD-10-CM

## 2024-06-17 DIAGNOSIS — E11.65 TYPE 2 DIABETES MELLITUS WITH HYPERGLYCEMIA, WITHOUT LONG-TERM CURRENT USE OF INSULIN: Primary | ICD-10-CM

## 2024-06-17 PROCEDURE — 99214 OFFICE O/P EST MOD 30 MIN: CPT | Mod: ,,, | Performed by: NURSE PRACTITIONER

## 2024-06-17 RX ORDER — LANCETS
1 EACH MISCELLANEOUS DAILY
Qty: 100 EACH | Refills: 0 | Status: SHIPPED | OUTPATIENT
Start: 2024-06-17

## 2024-06-17 RX ORDER — INSULIN PUMP SYRINGE, 3 ML
EACH MISCELLANEOUS
Qty: 1 EACH | Refills: 0 | Status: SHIPPED | OUTPATIENT
Start: 2024-06-17 | End: 2025-06-17

## 2024-06-17 NOTE — TELEPHONE ENCOUNTER
Spoke with the referral department regarding patient's endocrinology referral. She states that the patient's information was faxed to Dr. Keisha Ramirez's office on 6/6/24, but they have not heard anything about an appointment. The patient can contact Dr. Keisha Ramirez's office and inquire about an appointment. Called patient to instruct her to contact the office and ask about an appointment. No answer, left a voicemail for patient to call back.

## 2024-06-17 NOTE — TELEPHONE ENCOUNTER
Pt wants a glucose monitor sent to [facility], same as spouse. They have her name listed as LUISANA Gill.

## 2024-06-17 NOTE — PROGRESS NOTES
Hortencia Galvan DNP, CORBIN    [address] [place] [address]     PATIENT NAME: Kriss Gill  : 1955  DATE: 24  MRN: [id number]      Billing Provider: Hortencia Galvan DNP, FNP  Level of Service:   Patient PCP Information       Provider PCP Type    Logan Jones MD General            Reason for Visit / Chief Complaint: Diabetes (1 month follow up), Tingling (Tingling in her feet.), and Health Maintenance (Patient declines to schedule a colonoscopy or mammogram.)       Update PCP  Update Chief Complaint         History of Present Illness / Problem Focused Workflow     Kriss Gill presents to the clinic with Diabetes (1 month follow up), Tingling (Tingling in her feet.), and Health Maintenance (Patient declines to schedule a colonoscopy or mammogram.)     Diabetes  Pertinent negatives for hypoglycemia include no headaches or nervousness/anxiousness. Pertinent negatives for diabetes include no chest pain, no fatigue and no weakness.       Review of Systems     Review of Systems   Constitutional:  Negative for activity change, appetite change, chills, fatigue and fever.   HENT:  Negative for nasal congestion, ear pain, hearing loss, postnasal drip and sore throat.    Respiratory:  Negative for cough, chest tightness, shortness of breath and wheezing.    Cardiovascular:  Negative for chest pain, palpitations, leg swelling and claudication.   Gastrointestinal:  Negative for abdominal pain, change in bowel habit, constipation, diarrhea, nausea and vomiting.   Genitourinary:  Negative for dysuria.   Musculoskeletal:  Negative for arthralgias, back pain, gait problem and myalgias.   Integumentary:  Negative for rash.   Neurological:  Positive for numbness (parathesia lower extremities). Negative for weakness and headaches.   Psychiatric/Behavioral:  Negative for suicidal ideas. The patient is not nervous/anxious.         Medical / Social / Family History  "  History reviewed. No pertinent past medical history.    Past Surgical History:   Procedure Laterality Date    HYSTERECTOMY         Social History  Ms. Kriss Gill  reports that she has never smoked. She has never been exposed to tobacco smoke. She has never used smokeless tobacco. She reports that she does not drink alcohol and does not use drugs.    Family History  Ms. Kriss Gill's family history includes Heart disease in her mother; Meningitis in her father; Tuberculosis in her father.    Medications and Allergies     Medications  Outpatient Medications Marked as Taking for the 6/17/24 encounter (Office Visit) with Hortencia Galvan, ZULMA, FNP   Medication Sig Dispense Refill    albuterol (VENTOLIN HFA) 90 mcg/actuation inhaler Inhale 2 puffs into the lungs every 6 (six) hours as needed for Wheezing. Rescue 25.5 g 0    metFORMIN (GLUCOPHAGE) 500 MG tablet Take 2 tablets (1,000 mg total) by mouth 2 (two) times daily with meals. 120 tablet 0    [DISCONTINUED] blood sugar diagnostic Strp 1 strip by Misc.(Non-Drug; Combo Route) route once daily. 100 strip 0    [DISCONTINUED] blood-glucose meter kit Use as instructed 1 each 0    [DISCONTINUED] lancets (LANCETS, SUPER THIN) Misc 1 Application by Misc.(Non-Drug; Combo Route) route Daily. 100 each 0       Allergies  Review of patient's allergies indicates:   Allergen Reactions    Pcn [penicillins]        Physical Examination     Vitals:    06/17/24 1022 06/17/24 1033   BP: (!) 151/87 123/73   BP Location: Right arm Left arm   Patient Position: Sitting Sitting   BP Method: Large (Automatic) Large (Automatic)   Pulse: 69    Resp: 18    Temp: 98.1 °F (36.7 °C)    TempSrc: Oral    SpO2: 95%    Weight: 66.7 kg (147 lb)    Height: 5' 4" (1.626 m)      Physical Exam  Vitals and nursing note reviewed.   Constitutional:       General: She is not in acute distress.     Appearance: Normal appearance. She is not ill-appearing.   Eyes:      Extraocular Movements: " Extraocular movements intact.      Pupils: Pupils are equal, round, and reactive to light.   Cardiovascular:      Rate and Rhythm: Normal rate and regular rhythm.      Heart sounds: Normal heart sounds.   Pulmonary:      Effort: Pulmonary effort is normal.      Breath sounds: Normal breath sounds.   Abdominal:      General: Bowel sounds are normal.      Palpations: Abdomen is soft.   Musculoskeletal:         General: Normal range of motion.   Skin:     Findings: No rash.   Neurological:      General: No focal deficit present.      Mental Status: She is alert and oriented to person, place, and time. Mental status is at baseline.   Psychiatric:         Mood and Affect: Mood normal.         Behavior: Behavior normal.          Assessment and Plan (including Health Maintenance)      Problem List  Smart Sets  Document Outside HM   :    Plan:         Health Maintenance Due   Topic Date Due    Hepatitis C Screening  Never done    TETANUS VACCINE  Never done    Mammogram  Never done    DEXA Scan  Never done    Colorectal Cancer Screening  Never done    Shingles Vaccine (1 of 2) Never done    RSV Vaccine (Age 60+ and Pregnant patients) (1 - 1-dose 60+ series) Never done    Pneumococcal Vaccines (Age 65+) (1 of 1 - PCV) Never done    COVID-19 Vaccine (1 - 2023-24 season) Never done       Problem List Items Addressed This Visit    None  Visit Diagnoses       Type 2 diabetes mellitus with hyperglycemia, without long-term current use of insulin    -  Primary          Type 2 diabetes mellitus with hyperglycemia, without long-term current use of insulin       Health Maintenance Topics with due status: Not Due       Topic Last Completion Date    Hemoglobin A1c (Diabetic Prevention Screening) 05/20/2024    Lipid Panel 05/20/2024            Future Appointments   Date Time Provider Department Center   8/15/2024  8:40 AM Hortencia Galvan DNP, FNP [facility]        Follow up in about 2 months (around 8/17/2024).      Signature:  Hortencia Galvan DNP, FNP  [address] [place] [address]  Phone #: [phone]  Fax #: [phone]    Date of encounter: 6/17/24    Patient Instructions   Continue current dose of metformin. Continue to monitor blood sugar daily. Will repeat HgbA1c at next visit in 2 months. Continue diet and exercise.

## 2024-06-18 DIAGNOSIS — E11.65 TYPE 2 DIABETES MELLITUS WITH HYPERGLYCEMIA, WITHOUT LONG-TERM CURRENT USE OF INSULIN: Primary | ICD-10-CM

## 2024-06-18 RX ORDER — INSULIN PUMP SYRINGE, 3 ML
EACH MISCELLANEOUS
Qty: 1 EACH | Refills: 0 | Status: SHIPPED | OUTPATIENT
Start: 2024-06-18 | End: 2025-06-18

## 2024-06-18 NOTE — TELEPHONE ENCOUNTER
Patient would like a Freestyle Lite meter sent to [facility], stating that she has a coupon for it.

## 2024-06-18 NOTE — TELEPHONE ENCOUNTER
Monitor and supplies have been ordered,sent to [facility] mail order which is listed under Scar [facility] chart med list.

## 2024-06-19 NOTE — PATIENT INSTRUCTIONS
Continue current dose of metformin. Continue to monitor blood sugar daily. Will repeat HgbA1c at next visit in 2 months. Continue diet and exercise.

## 2024-08-15 ENCOUNTER — OFFICE VISIT (OUTPATIENT)
Dept: FAMILY MEDICINE | Facility: CLINIC | Age: 69
End: 2024-08-15
Payer: MEDICARE

## 2024-08-15 VITALS
BODY MASS INDEX: 23.9 KG/M2 | SYSTOLIC BLOOD PRESSURE: 119 MMHG | DIASTOLIC BLOOD PRESSURE: 76 MMHG | WEIGHT: 140 LBS | RESPIRATION RATE: 18 BRPM | HEART RATE: 69 BPM | OXYGEN SATURATION: 95 % | TEMPERATURE: 98 F | HEIGHT: 64 IN

## 2024-08-15 DIAGNOSIS — E11.65 TYPE 2 DIABETES MELLITUS WITH HYPERGLYCEMIA, WITHOUT LONG-TERM CURRENT USE OF INSULIN: Primary | ICD-10-CM

## 2024-08-15 DIAGNOSIS — H61.22 IMPACTED CERUMEN OF LEFT EAR: ICD-10-CM

## 2024-08-15 LAB
ANION GAP SERPL CALCULATED.3IONS-SCNC: 9 MMOL/L (ref 7–16)
BUN SERPL-MCNC: 16 MG/DL (ref 7–18)
BUN/CREAT SERPL: 18 (ref 6–20)
CALCIUM SERPL-MCNC: 9.5 MG/DL (ref 8.5–10.1)
CHLORIDE SERPL-SCNC: 103 MMOL/L (ref 98–107)
CHOLEST SERPL-MCNC: 238 MG/DL (ref 0–200)
CHOLEST/HDLC SERPL: 5.1 {RATIO}
CO2 SERPL-SCNC: 30 MMOL/L (ref 21–32)
CREAT SERPL-MCNC: 0.88 MG/DL (ref 0.55–1.02)
EGFR (NO RACE VARIABLE) (RUSH/TITUS): 71 ML/MIN/1.73M2
EST. AVERAGE GLUCOSE BLD GHB EST-MCNC: 186 MG/DL
GLUCOSE SERPL-MCNC: 135 MG/DL (ref 74–106)
HBA1C MFR BLD HPLC: 8.1 % (ref 4.5–6.6)
HDLC SERPL-MCNC: 47 MG/DL (ref 40–60)
LDLC SERPL CALC-MCNC: 169 MG/DL
LDLC/HDLC SERPL: 3.6 {RATIO}
NONHDLC SERPL-MCNC: 191 MG/DL
POTASSIUM SERPL-SCNC: 4.6 MMOL/L (ref 3.5–5.1)
SODIUM SERPL-SCNC: 137 MMOL/L (ref 136–145)
TRIGL SERPL-MCNC: 111 MG/DL (ref 35–150)
VLDLC SERPL-MCNC: 22 MG/DL

## 2024-08-15 PROCEDURE — 80048 BASIC METABOLIC PNL TOTAL CA: CPT | Mod: ,,, | Performed by: CLINICAL MEDICAL LABORATORY

## 2024-08-15 PROCEDURE — 80061 LIPID PANEL: CPT | Mod: ,,, | Performed by: CLINICAL MEDICAL LABORATORY

## 2024-08-15 PROCEDURE — 83036 HEMOGLOBIN GLYCOSYLATED A1C: CPT | Mod: ,,, | Performed by: CLINICAL MEDICAL LABORATORY

## 2024-08-15 RX ORDER — LANCETS 28 GAUGE
EACH MISCELLANEOUS
COMMUNITY
Start: 2024-06-17 | End: 2024-08-15

## 2024-08-15 NOTE — PROGRESS NOTES
"   Hortencia Galvan DNP, FNP    [facility]  [address] [place] [address] [place] [address]     PATIENT NAME: Kriss Gill  : 1955  DATE: 8/15/24  MRN: [id number]      Billing Provider: Hortencia Galvan DNP, FNP  Level of Service:   Patient PCP Information       Provider PCP Type    Hortencia Galvan DNP, FNP General            Reason for Visit / Chief Complaint: Diabetes (2 month check up.  She says her FS glucose has been 138-159.  She says her biggest problems are figuring out what to cook and trouble with getting her glucometer working.  Pt is fasting for labs), Ear Fullness (Pt says she's having trouble with her L ear.  She says it sounds like "roaring" at times.  She wonders if she has wax in it. ), and Health Maintenance (Discussed overdue care gaps.  Pt declines cscope, mammo, and dexa. She says she has had an eye exam at [facility] [place] - send request for record)       Update PCP  Update Chief Complaint         History of Present Illness / Problem Focused Workflow     Kriss Gill presents to the clinic with Diabetes (2 month check up.  She says her FS glucose has been 138-159.  She says her biggest problems are figuring out what to cook and trouble with getting her glucometer working.  Pt is fasting for labs), Ear Fullness (Pt says she's having trouble with her L ear.  She says it sounds like "roaring" at times.  She wonders if she has wax in it. ), and Health Maintenance (Discussed overdue care gaps.  Pt declines cscope, mammo, and dexa. She says she has had an eye exam at [facility] [place] - send request for record)     Diabetes  Pertinent negatives for hypoglycemia include no headaches or nervousness/anxiousness. Pertinent negatives for diabetes include no chest pain, no fatigue and no weakness.   Ear Fullness   Pertinent negatives include no abdominal pain, coughing, diarrhea, headaches, hearing loss, rash, sore throat or vomiting.       Review of Systems     Review of Systems "   Constitutional:  Negative for activity change, appetite change, chills, fatigue and fever.   HENT:  Positive for ear pain. Negative for nasal congestion, hearing loss, postnasal drip and sore throat.    Respiratory:  Negative for cough, chest tightness, shortness of breath and wheezing.    Cardiovascular:  Negative for chest pain, palpitations, leg swelling and claudication.   Gastrointestinal:  Negative for abdominal pain, change in bowel habit, constipation, diarrhea, nausea and vomiting.   Genitourinary:  Negative for dysuria.   Musculoskeletal:  Negative for arthralgias, back pain, gait problem and myalgias.   Integumentary:  Negative for rash.   Neurological:  Negative for weakness and headaches.   Psychiatric/Behavioral:  Negative for suicidal ideas. The patient is not nervous/anxious.         Medical / Social / Family History     Past Medical History:   Diagnosis Date    Diabetes mellitus, type 2        Past Surgical History:   Procedure Laterality Date    HYSTERECTOMY         Social History  Ms. Kriss Gill  reports that she has never smoked. She has never been exposed to tobacco smoke. She has never used smokeless tobacco. She reports that she does not drink alcohol and does not use drugs.    Family History  Ms. Kriss Gill's family history includes Heart disease in her mother; Meningitis in her father; Tuberculosis in her father.    Medications and Allergies     Medications  Outpatient Medications Marked as Taking for the 8/15/24 encounter (Office Visit) with Hortencia Galvan, ZULMA, FNP   Medication Sig Dispense Refill    albuterol (VENTOLIN HFA) 90 mcg/actuation inhaler Inhale 2 puffs into the lungs every 6 (six) hours as needed for Wheezing. Rescue 25.5 g 0    blood sugar diagnostic Strp 1 strip by Misc.(Non-Drug; Combo Route) route once daily. 100 strip 0    blood-glucose meter (FREESTYLE LITE METER) kit Use as instructed 1 each 0    blood-glucose meter kit Use as instructed 1 each 0     "FREESTYLE LANCETS 28 gauge lancets Apply topically.      lancets (LANCETS, SUPER THIN) Misc 1 Application by Misc.(Non-Drug; Combo Route) route Daily. 100 each 0    metFORMIN (GLUCOPHAGE) 500 MG tablet Take 2 tablets (1,000 mg total) by mouth 2 (two) times daily with meals. (Patient taking differently: Take 850 mg by mouth 2 (two) times daily with meals.) 120 tablet 0       Allergies  Review of patient's allergies indicates:   Allergen Reactions    Pcn [penicillins]        Physical Examination     Vitals:    08/15/24 0853   BP: 119/76   BP Location: Left arm   Patient Position: Sitting   BP Method: Medium (Automatic)   Pulse: 69   Resp: 18   Temp: 98 °F (36.7 °C)   TempSrc: Oral   SpO2: 95%   Weight: 63.5 kg (140 lb)   Height: 5' 4" (1.626 m)     Physical Exam  Vitals and nursing note reviewed.   Constitutional:       General: She is not in acute distress.  HENT:      Left Ear: There is impacted cerumen.      Ears:      Comments: TTP over left TMJ     Nose: Nose normal.      Mouth/Throat:      Mouth: Mucous membranes are moist.   Eyes:      Pupils: Pupils are equal, round, and reactive to light.   Cardiovascular:      Rate and Rhythm: Normal rate and regular rhythm.      Pulses: Normal pulses.      Heart sounds: Normal heart sounds. No murmur heard.  Pulmonary:      Effort: Pulmonary effort is normal. No respiratory distress.      Breath sounds: Normal breath sounds. No wheezing, rhonchi or rales.   Chest:      Chest wall: No tenderness.   Abdominal:      General: Bowel sounds are normal.      Palpations: Abdomen is soft.   Musculoskeletal:         General: Normal range of motion.      Cervical back: Normal range of motion and neck supple.      Right lower leg: No edema.      Left lower leg: No edema.   Skin:     General: Skin is warm and dry.   Neurological:      General: No focal deficit present.      Mental Status: She is alert and oriented to person, place, and time.          Assessment and Plan (including " Health Maintenance)      Problem List  Smart Sets  Document Outside HM   :    Plan:         Health Maintenance Due   Topic Date Due    Hepatitis C Screening  Never done    Pneumococcal Vaccines (Age 65+) (1 of 2 - PCV) Never done    TETANUS VACCINE  Never done    Mammogram  Never done    Low Dose Statin  Never done    DEXA Scan  Never done    Colorectal Cancer Screening  Never done    Shingles Vaccine (1 of 2) Never done    RSV Vaccine (Age 60+ and Pregnant patients) (1 - 1-dose 60+ series) Never done    COVID-19 Vaccine (1 - 2023-24 season) Never done    Eye Exam  03/08/2024    Hemoglobin A1c  08/20/2024       Problem List Items Addressed This Visit          ENT    Impacted cerumen of left ear     Other Visit Diagnoses       Type 2 diabetes mellitus with hyperglycemia, without long-term current use of insulin    -  Primary    Relevant Orders    Hemoglobin A1C    Lipid Panel    Basic Metabolic Panel          Type 2 diabetes mellitus with hyperglycemia, without long-term current use of insulin  -     Hemoglobin A1C; Future; Expected date: 08/15/2024  -     Lipid Panel; Future; Expected date: 08/15/2024  -     Basic Metabolic Panel; Future; Expected date: 08/15/2024    Impacted cerumen of left ear       Health Maintenance Topics with due status: Not Due       Topic Last Completion Date    Influenza Vaccine 03/22/2024    Diabetes Urine Screening 05/20/2024    Foot Exam 05/20/2024    Lipid Panel 05/20/2024           Future Appointments   Date Time Provider Department Center   11/15/2024 11:00 AM Hortencia Galvan DNP, FNP [facility]        Follow up in about 3 months (around 11/15/2024).     Signature:  Hortencia Galvan DNP, FNP  [address] [place] [address]  Phone #: [phone]  Fax #: [phone]    Date of encounter: 8/15/24    Patient Instructions   Continue current medication regimen. Will call pt with lab results. Recommend monthly breast exams.  Recommend exercise 30 minutes per day most days of the week. Follow up in 3 months for chronic medical problems.

## 2024-08-15 NOTE — LETTER
AUTHORIZATION FOR RELEASE OF   CONFIDENTIAL INFORMATION    Dear Dr. Swann,    We are seeing Kriss Gill, date of birth 1955, in the clinic at [facility]. Hortencia Galvan, ZULMA, JACKP is the patient's PCP. Kriss Gill has an outstanding lab/procedure at the time we reviewed her chart. In order to help keep her health information updated, she has authorized us to request the following medical record(s):        (  )  MAMMOGRAM                                      (  )  COLONOSCOPY      (  )  PAP SMEAR                                          (  )  OUTSIDE LAB RESULTS     (  )  DEXA SCAN                                          ( X )  EYE EXAM            (  )  FOOT EXAM                                          (  )  ENTIRE RECORD     (  )  OUTSIDE IMMUNIZATIONS                 (  )  _______________         Please fax records to Hortencia Galvan, ZULMA, FNP, at [phone]       If you have any questions, please contact Mela Arce RN, at [phone].           Patient Name: Kriss Gill  : 1955  Patient Phone #: [phone]

## 2024-08-15 NOTE — PATIENT INSTRUCTIONS
Continue current medication regimen. Will call pt with lab results. Recommend monthly breast exams. Recommend exercise 30 minutes per day most days of the week. Follow up in 3 months for chronic medical problems.

## 2024-08-15 NOTE — PROGRESS NOTES
L ear flushed per CORBIN Gilbert, order.  10 drops of Debrox solution placed in ear and waited 5 minutes.  Flushed ear with warm water with small pieces of white cerumen returned.  Noted L ear canal clear when done and pt reports improved hearing.

## 2024-08-19 ENCOUNTER — TELEPHONE (OUTPATIENT)
Dept: FAMILY MEDICINE | Facility: CLINIC | Age: 69
End: 2024-08-19
Payer: MEDICARE

## 2024-08-19 NOTE — TELEPHONE ENCOUNTER
Pt requesting info on cooking meals that comply with DM meal planning, elevated Hgba1c 8.1%, elevated cholesterol 238mg. I made a call and left VM for Pt to call clinic back after lunch or I would call again, once I get back from my dentist appt.  I offered to assist with monitor and meal planning.

## 2024-08-19 NOTE — TELEPHONE ENCOUNTER
Pt calls back, states just not knowing what to eat since peas and cornbread are what is eaten at meals most times. I explained the many non-starchy veggies that can be eaten along with starchy veggies so the amount can be smaller with the starch, best to fill up on non-starch veggies as brocolli,cabbage,greens,green beans,lettuce,squash, tomatoes........ I asked about the Meal Planning guide I have given in the past and Pt states will go look for it, but I offered another one anytime, but the easy way to remember foods that will effect BS are Starch, Fruit, Milk and use of 1/2cup best to remember.  Pt tells me Dr blanco's office gave a sheet to follow that peas, butterbeans were not allowed. I am not sure what Pt got but I explained it didn't come from me as before we have discussed meal planning and importance of portion control.  Pt states walking more and drinking water thru the day.  Pt does say only using 850mg metformin BID since spouse has bottles of it at home.[facility]  I explained BS control has improved from the 13.5%, and the Fair food from July should be gone by now, therefore keep on striving for 120-130mg BS.  Pt states not understanding use of glucose monitor, which I offered to look at if wanted. Gets testing supplies from [employer] mail order.

## 2024-08-27 ENCOUNTER — NUTRITION (OUTPATIENT)
Dept: FAMILY MEDICINE | Facility: CLINIC | Age: 69
End: 2024-08-27
Payer: MEDICARE

## 2024-08-27 DIAGNOSIS — E11.65 TYPE 2 DIABETES MELLITUS WITH HYPERGLYCEMIA, WITHOUT LONG-TERM CURRENT USE OF INSULIN: Primary | ICD-10-CM

## 2024-08-28 ENCOUNTER — PATIENT OUTREACH (OUTPATIENT)
Facility: HOSPITAL | Age: 69
End: 2024-08-28
Payer: MEDICARE

## 2024-08-28 NOTE — PROGRESS NOTES
Pt having troubles with using Free Style Lite monitor. With step by step procedures of testing BS Pt was able to see what was causing E-R readings. Pt to use lighter number for lancet to stick finger, to wait for the droplet on the screen before testing, to use one side or another on the strip to obtain blood NOT on top or bottom as doing earlier. Pt able to test own BS with 107mg results after eating an earlier breakfast.  Pt reports having walking regime, drinking water daily, and still using spouse's 850mg metformin BID.   Hgba1c 8.1% down from 13.5% 3 months ago, Bravo! Given for success and now to continue to strive for another improvement on next lab.

## 2024-08-28 NOTE — PROGRESS NOTES
Population Health Chart Review & Patient Outreach Details    Updates Requested / Reviewed:  [x]  Care Team Updated    Health Maintenance Topics Addressed and Outreach Outcomes / Actions Taken:  Diabetic Eye Exam [x] HM Updated with March 2024 Eye Exam (Dr. Swann). History Updated.

## 2024-11-15 ENCOUNTER — OFFICE VISIT (OUTPATIENT)
Dept: FAMILY MEDICINE | Facility: CLINIC | Age: 69
End: 2024-11-15
Payer: MEDICARE

## 2024-11-15 VITALS
BODY MASS INDEX: 23.9 KG/M2 | OXYGEN SATURATION: 97 % | RESPIRATION RATE: 18 BRPM | HEIGHT: 64 IN | SYSTOLIC BLOOD PRESSURE: 119 MMHG | TEMPERATURE: 98 F | DIASTOLIC BLOOD PRESSURE: 77 MMHG | WEIGHT: 140 LBS | HEART RATE: 79 BPM

## 2024-11-15 DIAGNOSIS — E11.65 TYPE 2 DIABETES MELLITUS WITH HYPERGLYCEMIA, WITHOUT LONG-TERM CURRENT USE OF INSULIN: Primary | ICD-10-CM

## 2024-11-15 LAB
ALBUMIN SERPL BCP-MCNC: 3.8 G/DL (ref 3.4–4.8)
ALBUMIN/GLOB SERPL: 1.3 {RATIO}
ALP SERPL-CCNC: 78 U/L (ref 40–150)
ALT SERPL W P-5'-P-CCNC: 9 U/L (ref 0–55)
ANION GAP SERPL CALCULATED.3IONS-SCNC: 11 MMOL/L (ref 7–16)
AST SERPL W P-5'-P-CCNC: 29 U/L (ref 5–34)
BILIRUB SERPL-MCNC: 0.6 MG/DL
BUN SERPL-MCNC: 15 MG/DL (ref 10–20)
BUN/CREAT SERPL: 18 (ref 6–20)
CALCIUM SERPL-MCNC: 8.8 MG/DL (ref 8.4–10.2)
CHLORIDE SERPL-SCNC: 104 MMOL/L (ref 98–107)
CHOLEST SERPL-MCNC: 226 MG/DL
CHOLEST/HDLC SERPL: 4.6 {RATIO}
CO2 SERPL-SCNC: 28 MMOL/L (ref 23–31)
CREAT SERPL-MCNC: 0.82 MG/DL (ref 0.55–1.02)
EGFR (NO RACE VARIABLE) (RUSH/TITUS): 77 ML/MIN/1.73M2
EST. AVERAGE GLUCOSE BLD GHB EST-MCNC: 163 MG/DL
GLOBULIN SER-MCNC: 3 G/DL (ref 2–4)
GLUCOSE SERPL-MCNC: 109 MG/DL (ref 82–115)
HBA1C MFR BLD HPLC: 7.3 %
HDLC SERPL-MCNC: 49 MG/DL (ref 35–60)
LDLC SERPL CALC-MCNC: 158 MG/DL
LDLC/HDLC SERPL: 3.2 {RATIO}
NONHDLC SERPL-MCNC: 177 MG/DL
POTASSIUM SERPL-SCNC: 4 MMOL/L (ref 3.5–5.1)
PROT SERPL-MCNC: 6.8 G/DL (ref 5.8–7.6)
SODIUM SERPL-SCNC: 139 MMOL/L (ref 136–145)
TRIGL SERPL-MCNC: 96 MG/DL (ref 37–140)
VLDLC SERPL-MCNC: 19 MG/DL

## 2024-11-15 PROCEDURE — 80053 COMPREHEN METABOLIC PANEL: CPT | Mod: ,,, | Performed by: CLINICAL MEDICAL LABORATORY

## 2024-11-15 PROCEDURE — 83036 HEMOGLOBIN GLYCOSYLATED A1C: CPT | Mod: ,,, | Performed by: CLINICAL MEDICAL LABORATORY

## 2024-11-15 PROCEDURE — 99214 OFFICE O/P EST MOD 30 MIN: CPT | Mod: ,,, | Performed by: NURSE PRACTITIONER

## 2024-11-15 PROCEDURE — 80061 LIPID PANEL: CPT | Mod: ,,, | Performed by: CLINICAL MEDICAL LABORATORY

## 2024-11-15 NOTE — PROGRESS NOTES
Hortencia Galvan DNP, FNP    [address] [place] [address]     PATIENT NAME: Kriss Gill  : 1955  DATE: 11/15/24  MRN: [id number]      Billing Provider: Hortencia Galvan DNP, FNP  Level of Service:   Patient PCP Information       Provider PCP Type    Hortencia Galvan DNP, FNP General            Reason for Visit / Chief Complaint: Diabetes (3 month follow up.  She says she's been off schedule with eating. She checks glucose at home approx every other day but sometimes weekly.  She says results are usually 130s-140s lately.  ) and Health Maintenance (Discussed care gaps of mammo, DEXA, and cscope.  Pt declines)       Update PCP  Update Chief Complaint         History of Present Illness / Problem Focused Workflow     Kriss Gill presents to the clinic with Diabetes (3 month follow up.  She says she's been off schedule with eating. She checks glucose at home approx every other day but sometimes weekly.  She says results are usually 130s-140s lately.  ) and Health Maintenance (Discussed care gaps of mammo, DEXA, and cscope.  Pt declines)     Diabetes  Pertinent negatives for hypoglycemia include no headaches or nervousness/anxiousness. Pertinent negatives for diabetes include no chest pain, no fatigue and no weakness.       Review of Systems     Review of Systems   Constitutional:  Negative for activity change, appetite change, chills, fatigue and fever.   HENT:  Negative for nasal congestion, ear pain, hearing loss, postnasal drip and sore throat.    Respiratory:  Negative for cough, chest tightness, shortness of breath and wheezing.    Cardiovascular:  Negative for chest pain, palpitations, leg swelling and claudication.   Gastrointestinal:  Negative for abdominal pain, change in bowel habit, constipation, diarrhea, nausea and vomiting.   Genitourinary:  Negative for dysuria.   Musculoskeletal:  Negative for arthralgias, back pain, gait problem and myalgias.  "  Integumentary:  Negative for rash.   Neurological:  Negative for weakness and headaches.   Psychiatric/Behavioral:  Negative for suicidal ideas. The patient is not nervous/anxious.         Medical / Social / Family History     Past Medical History:   Diagnosis Date    Diabetes mellitus, type 2     Diabetic eye exam 03/11/2024    Dr. Ginna Swann - [facility]       Past Surgical History:   Procedure Laterality Date    HYSTERECTOMY         Social History  Ms. Kriss Gill  reports that she has never smoked. She has never been exposed to tobacco smoke. She has never used smokeless tobacco. She reports that she does not drink alcohol and does not use drugs.    Family History  Ms. Kriss Gill's family history includes Heart disease in her mother; Meningitis in her father; Tuberculosis in her father.    Medications and Allergies     Medications  Outpatient Medications Marked as Taking for the 11/15/24 encounter (Office Visit) with Hortencia Galvan DNP, FNP   Medication Sig Dispense Refill    blood sugar diagnostic Strp 1 strip by Misc.(Non-Drug; Combo Route) route once daily. 100 strip 0    blood-glucose meter kit Use as instructed 1 each 0    lancets (LANCETS, SUPER THIN) Misc 1 Application by Misc.(Non-Drug; Combo Route) route Daily. 100 each 0    metFORMIN (GLUCOPHAGE) 500 MG tablet Take 2 tablets (1,000 mg total) by mouth 2 (two) times daily with meals. (Patient taking differently: Take 850 mg by mouth 2 (two) times daily with meals.) 120 tablet 0       Allergies  Review of patient's allergies indicates:   Allergen Reactions    Pcn [penicillins]        Physical Examination     Vitals:    11/15/24 1054   BP: 119/77   BP Location: Left arm   Patient Position: Sitting   Pulse: 79   Resp: 18   Temp: 98.3 °F (36.8 °C)   TempSrc: Oral   SpO2: 97%   Weight: 63.5 kg (140 lb)   Height: 5' 4" (1.626 m)     Physical Exam  Vitals and nursing note reviewed.   Constitutional:       General: She is not in " acute distress.  HENT:      Nose: Nose normal.      Mouth/Throat:      Mouth: Mucous membranes are moist.   Eyes:      Pupils: Pupils are equal, round, and reactive to light.   Cardiovascular:      Rate and Rhythm: Normal rate and regular rhythm.      Pulses: Normal pulses.      Heart sounds: Normal heart sounds. No murmur heard.  Pulmonary:      Effort: Pulmonary effort is normal. No respiratory distress.      Breath sounds: Normal breath sounds. No wheezing, rhonchi or rales.   Chest:      Chest wall: No tenderness.   Abdominal:      General: Bowel sounds are normal.      Palpations: Abdomen is soft.   Musculoskeletal:         General: Normal range of motion.      Cervical back: Normal range of motion and neck supple.      Right lower leg: No edema.      Left lower leg: No edema.   Skin:     General: Skin is warm and dry.   Neurological:      General: No focal deficit present.      Mental Status: She is alert and oriented to person, place, and time.          Assessment and Plan (including Health Maintenance)      Problem List  Smart Sets  Document Outside HM   :    Plan:         Health Maintenance Due   Topic Date Due    Hepatitis C Screening  Never done    Pneumococcal Vaccines (Age 65+) (1 of 2 - PCV) Never done    TETANUS VACCINE  Never done    Mammogram  Never done    Low Dose Statin  Never done    DEXA Scan  Never done    Colorectal Cancer Screening  Never done    Shingles Vaccine (1 of 2) Never done    COVID-19 Vaccine (1 - 2024-25 season) Never done       Problem List Items Addressed This Visit    None  Visit Diagnoses       Type 2 diabetes mellitus with hyperglycemia, without long-term current use of insulin    -  Primary    Relevant Orders    Hemoglobin A1C (Completed)    Lipid Panel (Completed)    Comprehensive Metabolic Panel (Completed)    Body mass index (BMI) 24.0-24.9, adult              Type 2 diabetes mellitus with hyperglycemia, without long-term current use of insulin  -     Hemoglobin A1C;  Future; Expected date: 11/15/2024  -     Lipid Panel; Future; Expected date: 11/15/2024  -     Comprehensive Metabolic Panel; Future; Expected date: 11/15/2024    Body mass index (BMI) 24.0-24.9, adult       Health Maintenance Topics with due status: Not Due       Topic Last Completion Date    Eye Exam 03/11/2024    Diabetes Urine Screening 05/20/2024    Foot Exam 05/20/2024    Lipid Panel 11/15/2024    Hemoglobin A1c 11/15/2024    RSV Vaccine (Age 60+ and Pregnant patients) Not Due           Future Appointments   Date Time Provider Department Center   2/18/2025 11:00 AM Hortencia Galvan DNP, FNP [facility]   11/12/2025  9:00 AM AWV NURSE, [facility]        Follow up in about 3 months (around 2/15/2025).     Signature:  Hortencia Galvan DNP, FNP  [address] [place] [address]  Phone #: [phone]  Fax #: [phone]    Date of encounter: 11/15/24    Patient Instructions   Continue current medication regimen. Will call pt with lab results. Recommend monthly breast exams. Recommend exercise 30 minutes per day most days of the week. Follow up in 3 months for chronic medical problems.